# Patient Record
Sex: FEMALE | Race: WHITE | NOT HISPANIC OR LATINO | Employment: FULL TIME | URBAN - METROPOLITAN AREA
[De-identification: names, ages, dates, MRNs, and addresses within clinical notes are randomized per-mention and may not be internally consistent; named-entity substitution may affect disease eponyms.]

---

## 2017-01-12 ENCOUNTER — HOSPITAL ENCOUNTER (OUTPATIENT)
Dept: RADIOLOGY | Facility: HOSPITAL | Age: 37
Discharge: HOME | End: 2017-01-12
Attending: OBSTETRICS & GYNECOLOGY
Payer: COMMERCIAL

## 2017-01-12 ENCOUNTER — OFFICE VISIT (OUTPATIENT)
Dept: OBSTETRICS AND GYNECOLOGY | Facility: CLINIC | Age: 37
End: 2017-01-12
Payer: COMMERCIAL

## 2017-01-12 VITALS
BODY MASS INDEX: 26.08 KG/M2 | DIASTOLIC BLOOD PRESSURE: 64 MMHG | WEIGHT: 162.25 LBS | HEIGHT: 66 IN | SYSTOLIC BLOOD PRESSURE: 110 MMHG

## 2017-01-12 DIAGNOSIS — Z11.51 SCREENING FOR HPV (HUMAN PAPILLOMAVIRUS): ICD-10-CM

## 2017-01-12 DIAGNOSIS — Z12.31 ENCOUNTER FOR SCREENING MAMMOGRAM FOR MALIGNANT NEOPLASM OF BREAST: ICD-10-CM

## 2017-01-12 DIAGNOSIS — Z01.419 ENCOUNTER FOR ANNUAL ROUTINE GYNECOLOGICAL EXAMINATION: Primary | ICD-10-CM

## 2017-01-12 DIAGNOSIS — Z12.4 ENCOUNTER FOR PAPANICOLAOU SMEAR FOR CERVICAL CANCER SCREENING: ICD-10-CM

## 2017-01-12 PROCEDURE — 77067 SCR MAMMO BI INCL CAD: CPT | Mod: 26,,, | Performed by: RADIOLOGY

## 2017-01-12 PROCEDURE — 88175 CYTOPATH C/V AUTO FLUID REDO: CPT

## 2017-01-12 PROCEDURE — 77067 SCR MAMMO BI INCL CAD: CPT | Mod: TC

## 2017-01-12 PROCEDURE — 77063 BREAST TOMOSYNTHESIS BI: CPT | Mod: 26,,, | Performed by: RADIOLOGY

## 2017-01-12 PROCEDURE — 99395 PREV VISIT EST AGE 18-39: CPT | Mod: S$GLB,,, | Performed by: OBSTETRICS & GYNECOLOGY

## 2017-01-12 PROCEDURE — 87624 HPV HI-RISK TYP POOLED RSLT: CPT

## 2017-01-12 PROCEDURE — 99999 PR PBB SHADOW E&M-EST. PATIENT-LVL III: CPT | Mod: PBBFAC,,, | Performed by: OBSTETRICS & GYNECOLOGY

## 2017-01-12 NOTE — MR AVS SNAPSHOT
[facility]  [address]  [place] [address]  Phone: [phone]  Fax: [phone]                  Sue Mccullough   2017 9:00 AM   Office Visit    Description:  Female : 1980   Provider:  Caryn Jansen MD   Department:  [facility]           Reason for Visit     Well Woman           Diagnoses this Visit        Comments    Encounter for annual routine gynecological examination    -  Primary     Encounter for Papanicolaou smear for cervical cancer screening         Screening for HPV (human papillomavirus)         Encounter for screening mammogram for malignant neoplasm of breast                To Do List           Future Appointments        Provider Department Dept Phone    2017 10:50 AM [facility] MAMMO2 SCREEN [facility] [phone]      Goals (5 Years of Data)     None      Follow-Up and Disposition     Return in about 1 year (around 2018).    Follow-up and Disposition History      [facility]     [facility] -  Assistance  Registered nurses in the [facility] provide clinical advisement, health education, appointment booking, and other advisory services.  Call for this free service at [phone].             Medications           Message regarding Medications     Verify the changes and/or additions to your medication regime listed below are the same as discussed with your clinician today.  If any of these changes or additions are incorrect, please notify your healthcare provider.             Verify that the below list of medications is an accurate representation of the medications you are currently taking.  If none reported, the list may be blank. If incorrect, please contact your healthcare provider. Carry this list with you in case of emergency.           Current Medications     CRANBERRY CON&FR/B.COAG/C/CALC (CRANBERRY-PROBIOTIC ORAL) once a day    montelukast (SINGULAIR) 10  "mg tablet            Clinical Reference Information           Vital Signs - Last Recorded  Most recent update: 1/12/2017  8:53 AM by Lillie Portillo LPN    BP Ht Wt LMP BMI    110/64 5' 6" (1.676 m) 73.6 kg (162 lb 4.1 oz) 12/24/2016 26.19 kg/m2      Blood Pressure          Most Recent Value    BP  110/64      Allergies as of 1/12/2017     No Known Allergies      Immunizations Administered on Date of Encounter - 1/12/2017     None      Orders Placed During Today's Visit      Normal Orders This Visit    HPV DNA probe, amplified     Liquid-based pap smear, screening     Future Labs/Procedures Expected by Expires    Mammo Digital Screening Bilat with Tomosynthesis CAD  1/12/2017 3/15/2018      [facility] Sign-Up     Activating your [facility] account is as easy as 1-2-3!     1) Visit [email or web address], select Sign Up Now, enter this activation code and your date of birth, then select Next.  [email or web address]  Expires: 1/23/2017  4:10 PM      2) Create a username and password to use when you visit [facility] in the future and select a security question in case you lose your password and select Next.    3) Enter your e-mail address and click Sign Up!    Additional Information  If you have questions, please e-mail [email or web address] or call [phone] to talk to our [facility] staff. Remember, [facility] is NOT to be used for urgent needs. For medical emergencies, dial 911.         "

## 2017-01-12 NOTE — PROGRESS NOTES
Subjective:       Patient ID: Sue Mccullough is a 36 y.o. female.    Chief Complaint:  Well Woman (pap 2 of 3 ascus + hpv)      History of Present Illness  - here for annual. No complaints.    Past Medical History   Diagnosis Date    Abnormal Pap smear of cervix     Acid reflux     Headaches, cluster      history    HPV (human papilloma virus) infection        Past Surgical History   Procedure Laterality Date    Dilation and curettage of uterus      Cervical conization   w/ laser           Current Outpatient Prescriptions:     CRANBERRY CON&FR/B.COAG/C/CALC (CRANBERRY-PROBIOTIC ORAL), once a day, Disp: , Rfl:     montelukast (SINGULAIR) 10 mg tablet, , Disp: , Rfl:     Review of patient's allergies indicates:  No Known Allergies    GYN & OB History  Patient's last menstrual period was 2016.   Date of Last Pap: 2016    OB History    Para Term  AB SAB TAB Ectopic Multiple Living   5 3 3  2 2    3      # Outcome Date GA Lbr Prasanth/2nd Weight Sex Delivery Anes PTL Lv   5 Term 14    M Vag-Spont None  Y   4 Term 11    M Vag-Spont EPI  Y   3 Term 08    M Vag-Spont EPI  Y   2 SAB            1 SAB                   Social History     Social History    Marital status:      Spouse name: N/A    Number of children: N/A    Years of education: N/A     Occupational History    Not on file.     Social History Main Topics    Smoking status: Former Smoker    Smokeless tobacco: Not on file    Alcohol use Yes    Drug use: No    Sexual activity: Yes     Partners: Male     Birth control/ protection: Partner-Vasectomy     Other Topics Concern    Not on file     Social History Narrative       Review of Systems  Review of Systems   Respiratory: Negative for shortness of breath.    Cardiovascular: Negative for chest pain and palpitations.   Gastrointestinal: Negative for blood in stool, nausea and vomiting.   Genitourinary:        - see HPI   Skin: Negative for rash and wound.  "  Allergic/Immunologic: Negative for immunocompromised state.   Neurological: Negative for dizziness and syncope.   Hematological: Negative for adenopathy.   Psychiatric/Behavioral: Negative for behavioral problems.        Objective:     Vitals:    01/12/17 0853   BP: 110/64   Weight: 73.6 kg (162 lb 4.1 oz)   Height: 5' 6" (1.676 m)       Physical Exam:   Constitutional: She is oriented to person, place, and time. She appears well-developed and well-nourished.        Pulmonary/Chest: Right breast exhibits no mass, no nipple discharge, no skin change, no tenderness and no swelling. Left breast exhibits no mass, no nipple discharge, no skin change, no tenderness and no swelling. Breasts are symmetrical.        Abdominal: Soft. She exhibits no distension. There is no tenderness.     Genitourinary: Vagina normal and uterus normal. There is no tenderness or lesion on the right labia. There is no tenderness or lesion on the left labia. Cervix is normal. Right adnexum displays no mass, no tenderness and no fullness. Left adnexum displays no mass, no tenderness and no fullness. No vaginal discharge found. Additional cervical findings: pap smear done          Musculoskeletal: Moves all extremeties.       Neurological: She is alert and oriented to person, place, and time.     Psychiatric: She has a normal mood and affect.        Assessment/ Plan:     Orders Placed This Encounter    HPV DNA probe, amplified    Mammo Digital Screening Bilat with Tomosynthesis CAD    Liquid-based pap smear, screening       Sue was seen today for well woman.    Diagnoses and all orders for this visit:    Encounter for annual routine gynecological examination    Encounter for Papanicolaou smear for cervical cancer screening  -     Liquid-based pap smear, screening    Screening for HPV (human papillomavirus)  -     HPV DNA probe, amplified    Encounter for screening mammogram for malignant neoplasm of breast  -     Mammo Digital Screening " Bilat with Tomosynthesis CAD; Future        Return in about 1 year (around 1/12/2018) if Pap is normal and HPV test is negative.

## 2017-01-13 ENCOUNTER — TELEPHONE (OUTPATIENT)
Dept: OBSTETRICS AND GYNECOLOGY | Facility: CLINIC | Age: 37
End: 2017-01-13

## 2017-01-13 NOTE — TELEPHONE ENCOUNTER
Left detailed message informing pt that her mammogram was normal and she is to repeat it at age 41. Instructed to call back if she has any questions.    ----- Message from Caryn Jansen MD sent at 1/13/2017 10:48 AM CST -----  Call patient. Normal mammogram. Repeat at age 41.

## 2017-01-20 LAB — HUMAN PAPILLOMAVIRUS (HPV): NOT DETECTED

## 2018-02-01 ENCOUNTER — OFFICE VISIT (OUTPATIENT)
Dept: OBSTETRICS AND GYNECOLOGY | Facility: CLINIC | Age: 38
End: 2018-02-01
Payer: COMMERCIAL

## 2018-02-01 VITALS — BODY MASS INDEX: 26.68 KG/M2 | HEIGHT: 66 IN | WEIGHT: 166 LBS

## 2018-02-01 DIAGNOSIS — Z01.419 ENCOUNTER FOR GYNECOLOGICAL EXAMINATION: Primary | ICD-10-CM

## 2018-02-01 PROCEDURE — 99999 PR PBB SHADOW E&M-EST. PATIENT-LVL II: CPT | Mod: PBBFAC,,, | Performed by: OBSTETRICS & GYNECOLOGY

## 2018-02-01 PROCEDURE — 99395 PREV VISIT EST AGE 18-39: CPT | Mod: S$GLB,,, | Performed by: OBSTETRICS & GYNECOLOGY

## 2018-02-01 NOTE — PROGRESS NOTES
Subjective:       Patient ID: Sue Mccullough is a 38 y.o. female.    Chief Complaint:  Well Woman (last pap  neg, hpv neg ,pt had a yeast inf  and took diflucan and a 3 day treatment for yeast .)      History of Present Illness  - here for annual. Starting to feel that yeast symptoms are resolving; today used day 2 of a 3 day treatment for yeast.    Past Medical History:   Diagnosis Date    Abnormal Pap smear of cervix     Acid reflux     Headaches, cluster     history    HPV (human papilloma virus) infection        Past Surgical History:   Procedure Laterality Date    CERVICAL CONIZATION   W/ LASER      DILATION AND CURETTAGE OF UTERUS           Current Outpatient Prescriptions:     CRANBERRY CON&FR/B.COAG/C/CALC (CRANBERRY-PROBIOTIC ORAL), once a day, Disp: , Rfl:     montelukast (SINGULAIR) 10 mg tablet, , Disp: , Rfl:     Review of patient's allergies indicates:  No Known Allergies    GYN & OB History  Patient's last menstrual period was 01/15/2018.   Date of Last Pap: 2017    OB History    Para Term  AB Living   5 3 3   2 3   SAB TAB Ectopic Multiple Live Births   2       3      # Outcome Date GA Lbr Prasanth/2nd Weight Sex Delivery Anes PTL Lv   5 Term //    M Vag-Spont None  MACHELLE   4 Term /    M Vag-Spont EPI  [facility]HELLE   3 Term 08    M Vag-Spont EPI  [facility]HELLE   2 SAB            1 SAB                   Social History     Social History    Marital status:      Spouse name: N/A    Number of children: N/A    Years of education: N/A     Occupational History    Not on file.     Social History Main Topics    Smoking status: Former Smoker    Smokeless tobacco: Never Used    Alcohol use Yes    Drug use: No    Sexual activity: Yes     Partners: Male     Birth control/ protection: Partner-Vasectomy     Other Topics Concern    Not on file     Social History Narrative    No narrative on file       Family History   Problem Relation Age of Onset    Stroke Maternal  "Grandmother     Diabetes Maternal Grandfather     Miscarriages / Stillbirths Mother     Breast cancer Neg Hx     Colon cancer Neg Hx     Eclampsia Neg Hx     Hypertension Neg Hx     Ovarian cancer Neg Hx      labor Neg Hx        Review of Systems  Review of Systems   Respiratory: Negative for shortness of breath.    Cardiovascular: Negative for chest pain and palpitations.   Gastrointestinal: Negative for blood in stool, nausea and vomiting.   Genitourinary:        - see HPI   Skin: Negative for rash and wound.   Allergic/Immunologic: Negative for immunocompromised state.   Neurological: Negative for dizziness and syncope.   Hematological: Negative for adenopathy.   Psychiatric/Behavioral: Negative for behavioral problems.        Objective:     Vitals:    18 1412   Weight: 75.3 kg (166 lb 0.1 oz)   Height: 5' 6" (1.676 m)       Physical Exam:   Constitutional: She is oriented to person, place, and time. She appears well-developed and well-nourished.        Pulmonary/Chest: Right breast exhibits no mass, no nipple discharge, no skin change, no tenderness and no swelling. Left breast exhibits no mass, no nipple discharge, no skin change, no tenderness and no swelling. Breasts are symmetrical.        Abdominal: Soft. She exhibits no distension. There is no tenderness.     Genitourinary: Vagina normal and uterus normal. There is no tenderness or lesion on the right labia. There is no tenderness or lesion on the left labia. Cervix is normal. Right adnexum displays no mass, no tenderness and no fullness. Left adnexum displays no mass, no tenderness and no fullness. No vaginal discharge found.   Genitourinary Comments: Cream c/w yeast treatment in vault           Musculoskeletal: Moves all extremeties.       Neurological: She is alert and oriented to person, place, and time.     Psychiatric: She has a normal mood and affect.        Assessment/ Plan:          Sue was seen today for well " woman.    Diagnoses and all orders for this visit:    Encounter for gynecological examination        Follow-up in about 1 year (around 2/1/2019) for annual exam.

## 2019-02-22 ENCOUNTER — OFFICE VISIT (OUTPATIENT)
Dept: OBSTETRICS AND GYNECOLOGY | Facility: CLINIC | Age: 39
End: 2019-02-22
Payer: COMMERCIAL

## 2019-02-22 VITALS
BODY MASS INDEX: 27.19 KG/M2 | DIASTOLIC BLOOD PRESSURE: 60 MMHG | WEIGHT: 169.19 LBS | SYSTOLIC BLOOD PRESSURE: 106 MMHG | HEIGHT: 66 IN

## 2019-02-22 DIAGNOSIS — Z01.419 ENCOUNTER FOR GYNECOLOGICAL EXAMINATION: Primary | ICD-10-CM

## 2019-02-22 DIAGNOSIS — Z12.4 ENCOUNTER FOR PAPANICOLAOU SMEAR FOR CERVICAL CANCER SCREENING: ICD-10-CM

## 2019-02-22 PROCEDURE — 99395 PREV VISIT EST AGE 18-39: CPT | Mod: S$GLB,,, | Performed by: OBSTETRICS & GYNECOLOGY

## 2019-02-22 PROCEDURE — 99999 PR PBB SHADOW E&M-EST. PATIENT-LVL II: ICD-10-PCS | Mod: PBBFAC,,, | Performed by: OBSTETRICS & GYNECOLOGY

## 2019-02-22 PROCEDURE — 99395 PR PREVENTIVE VISIT,EST,18-39: ICD-10-PCS | Mod: S$GLB,,, | Performed by: OBSTETRICS & GYNECOLOGY

## 2019-02-22 PROCEDURE — 99999 PR PBB SHADOW E&M-EST. PATIENT-LVL II: CPT | Mod: PBBFAC,,, | Performed by: OBSTETRICS & GYNECOLOGY

## 2019-02-22 NOTE — PROGRESS NOTES
Subjective:       Patient ID: Sue Mccullough is a 39 y.o. female.    Chief Complaint:  Well Woman (last pap and hpv: normal 2017 last mammo: normal 2017)      History of Present Illness  - here for annual. No complaints. Regular periods.    Past Medical History:   Diagnosis Date    Abnormal Pap smear of cervix     Acid reflux     Headaches, cluster     history    HPV (human papilloma virus) infection        Past Surgical History:   Procedure Laterality Date    CERVICAL CONIZATION   W/ LASER      DILATION AND CURETTAGE OF UTERUS         No current outpatient medications on file.    Review of patient's allergies indicates:  No Known Allergies    GYN & OB History  Patient's last menstrual period was 2019.   Date of Last Pap: 2017    OB History    Para Term  AB Living   5 3 3   2 3   SAB TAB Ectopic Multiple Live Births   2       3      # Outcome Date GA Lbr Prasanth/2nd Weight Sex Delivery Anes PTL Lv   5 Term 14    M Vag-Spont None  [facility]LE   4 Term 11    M Vag-Spont EPI  [facility]LE   3 Term 08    M Vag-Spont EPI  [facility]HELLE   2 SAB            1 SAB                   Social History     Socioeconomic History    Marital status:      Spouse name: Not on file    Number of children: Not on file    Years of education: Not on file    Highest education level: Not on file   Social Needs    Financial resource strain: Not on file    Food insecurity - worry: Not on file    Food insecurity - inability: Not on file    Transportation needs - medical: Not on file    Transportation needs - non-medical: Not on file   Occupational History    Not on file   Tobacco Use    Smoking status: Former Smoker    Smokeless tobacco: Never Used   Substance and Sexual Activity    Alcohol use: Yes    Drug use: No    Sexual activity: Yes     Partners: Male     Birth control/protection: Partner-Vasectomy   Other Topics Concern    Not on file   Social History Narrative    Not on file  "      Family History   Problem Relation Age of Onset    Stroke Maternal Grandmother     Diabetes Maternal Grandfather     Miscarriages / Stillbirths Mother     Breast cancer Neg Hx     Colon cancer Neg Hx     Eclampsia Neg Hx     Hypertension Neg Hx     Ovarian cancer Neg Hx      labor Neg Hx        Review of Systems  Review of Systems   Respiratory: Negative for shortness of breath.    Cardiovascular: Negative for chest pain and palpitations.   Gastrointestinal: Negative for blood in stool, nausea and vomiting.   Genitourinary:        - see HPI   Skin: Negative for rash and wound.   Allergic/Immunologic: Negative for immunocompromised state.   Neurological: Negative for dizziness and syncope.   Hematological: Negative for adenopathy.   Psychiatric/Behavioral: Negative for behavioral problems.        Objective:     Vitals:    19 1034   BP: 106/60   Weight: 76.8 kg (169 lb 3.3 oz)   Height: 5' 6" (1.676 m)       Physical Exam:   Constitutional: She is oriented to person, place, and time. She appears well-developed and well-nourished.        Pulmonary/Chest: Right breast exhibits no mass, no nipple discharge, no skin change, no tenderness and no swelling. Left breast exhibits no mass, no nipple discharge, no skin change, no tenderness and no swelling. Breasts are symmetrical.        Abdominal: Soft. She exhibits no distension. There is no tenderness.     Genitourinary: Vagina normal and uterus normal. There is no tenderness or lesion on the right labia. There is no tenderness or lesion on the left labia. Cervix is normal. Right adnexum displays no mass, no tenderness and no fullness. Left adnexum displays no mass, no tenderness and no fullness. No vaginal discharge found.           Musculoskeletal: Moves all extremeties.       Neurological: She is alert and oriented to person, place, and time.     Psychiatric: She has a normal mood and affect.        Assessment/ Plan:          Sue was seen " today for well woman.    Diagnoses and all orders for this visit:    Encounter for gynecological examination    Encounter for Papanicolaou smear for cervical cancer screening  -     Cancel: Liquid-based pap smear, screening        Follow-up in about 1 year (around 2/22/2020) for annual exam.

## 2020-02-27 ENCOUNTER — OFFICE VISIT (OUTPATIENT)
Dept: OBSTETRICS AND GYNECOLOGY | Facility: CLINIC | Age: 40
End: 2020-02-27
Payer: COMMERCIAL

## 2020-02-27 VITALS
BODY MASS INDEX: 26.58 KG/M2 | HEIGHT: 66 IN | DIASTOLIC BLOOD PRESSURE: 72 MMHG | WEIGHT: 165.38 LBS | SYSTOLIC BLOOD PRESSURE: 118 MMHG

## 2020-02-27 DIAGNOSIS — Z11.51 ENCOUNTER FOR SCREENING FOR HUMAN PAPILLOMAVIRUS (HPV): ICD-10-CM

## 2020-02-27 DIAGNOSIS — Z12.4 ENCOUNTER FOR SCREENING FOR CERVICAL CANCER: ICD-10-CM

## 2020-02-27 DIAGNOSIS — Z01.419 ENCOUNTER FOR GYNECOLOGICAL EXAMINATION: Primary | ICD-10-CM

## 2020-02-27 PROCEDURE — 99999 PR PBB SHADOW E&M-EST. PATIENT-LVL II: ICD-10-PCS | Mod: PBBFAC,,, | Performed by: OBSTETRICS & GYNECOLOGY

## 2020-02-27 PROCEDURE — 87624 HPV HI-RISK TYP POOLED RSLT: CPT

## 2020-02-27 PROCEDURE — 99999 PR PBB SHADOW E&M-EST. PATIENT-LVL II: CPT | Mod: PBBFAC,,, | Performed by: OBSTETRICS & GYNECOLOGY

## 2020-02-27 PROCEDURE — 99395 PR PREVENTIVE VISIT,EST,18-39: ICD-10-PCS | Mod: S$GLB,,, | Performed by: OBSTETRICS & GYNECOLOGY

## 2020-02-27 PROCEDURE — 99395 PREV VISIT EST AGE 18-39: CPT | Mod: S$GLB,,, | Performed by: OBSTETRICS & GYNECOLOGY

## 2020-02-27 PROCEDURE — 88175 CYTOPATH C/V AUTO FLUID REDO: CPT

## 2020-02-27 RX ORDER — ESCITALOPRAM OXALATE 20 MG/1
10 TABLET ORAL
COMMUNITY
End: 2024-03-06 | Stop reason: DRUGHIGH

## 2020-02-27 RX ORDER — ALPRAZOLAM 0.5 MG/1
TABLET ORAL
COMMUNITY

## 2020-02-27 NOTE — PROGRESS NOTES
Subjective:       Patient ID: Sue Mccullough is a 40 y.o. female.    Chief Complaint:  Well Woman (last pap and HPV negative 17, last mammogram wnl 17)      History of Present Illness  - here for annual. No complaints. Regular periods.    Past Medical History:   Diagnosis Date    Abnormal Pap smear of cervix     Acid reflux     Anxiety     Headaches, cluster     history    HPV (human papilloma virus) infection        Past Surgical History:   Procedure Laterality Date    CERVICAL CONIZATION   W/ LASER      DILATION AND CURETTAGE OF UTERUS           Current Outpatient Medications:     ALPRAZolam (XANAX) 0.5 MG tablet, alprazolam 0.5 mg tablet  Take 1 tablet 3 times a day by oral route., Disp: , Rfl:     escitalopram oxalate (LEXAPRO) 20 MG tablet, 10 mg., Disp: , Rfl:     Review of patient's allergies indicates:  No Known Allergies    GYN & OB History  Patient's last menstrual period was 2020 (exact date).   Date of Last Pap: 2017    OB History    Para Term  AB Living   5 3 3   2 3   SAB TAB Ectopic Multiple Live Births   2       3      # Outcome Date GA Lbr Prasanth/2nd Weight Sex Delivery Anes PTL Lv   5 Term 14    M Vag-Spont None  [facility]LE   4 Term 11    M Vag-Spont EPI  [facility]LE   3 Term 08    M Vag-Spont EPI  [facility]LE   2 SAB            1 SAB                Social History     Socioeconomic History    Marital status:      Spouse name: Not on file    Number of children: Not on file    Years of education: Not on file    Highest education level: Not on file   Occupational History    Not on file   Social Needs    Financial resource strain: Not on file    Food insecurity:     Worry: Not on file     Inability: Not on file    Transportation needs:     Medical: Not on file     Non-medical: Not on file   Tobacco Use    Smoking status: Former Smoker    Smokeless tobacco: Never Used   Substance and Sexual Activity    Alcohol use: Yes     Comment: occasional     Drug  "use: No    Sexual activity: Yes     Partners: Male     Birth control/protection: Partner-Vasectomy   Lifestyle    Physical activity:     Days per week: Not on file     Minutes per session: Not on file    Stress: Not on file   Relationships    Social connections:     Talks on phone: Not on file     Gets together: Not on file     Attends [employer]ars[employer] service: Not on file     Active member of club or organization: Not on file     Attends meetings of clubs or organizations: Not on file     Relationship status: Not on file   Other Topics Concern    Not on file   Social History Narrative    Not on file       Family History   Problem Relation Age of Onset    Stroke Maternal Grandmother     Diabetes Maternal Grandfather     Miscarriages / Stillbirths Mother     Breast cancer Neg Hx     Colon cancer Neg Hx     Eclampsia Neg Hx     Hypertension Neg Hx     Ovarian cancer Neg Hx      labor Neg Hx        Review of Systems  Review of Systems   Respiratory: Negative for shortness of breath.    Cardiovascular: Negative for chest pain and palpitations.   Gastrointestinal: Negative for blood in stool, nausea and vomiting.   Genitourinary:        - see HPI   Skin: Negative for rash and wound.   Allergic/Immunologic: Negative for immunocompromised state.   Neurological: Negative for dizziness and syncope.   Hematological: Negative for adenopathy.   Psychiatric/Behavioral: Negative for behavioral problems.        Objective:     Vitals:    20 1015   BP: 118/72   Weight: 75 kg (165 lb 5.5 oz)   Height: 5' 6" (1.676 m)       Physical Exam:   Constitutional: She is oriented to person, place, and time. She appears well-developed and well-nourished.        Pulmonary/Chest: Right breast exhibits no mass, no nipple discharge, no skin change, no tenderness and no swelling. Left breast exhibits no mass, no nipple discharge, no skin change, no tenderness and no swelling. Breasts are symmetrical.        Abdominal: Soft. She " exhibits no distension. There is no tenderness.     Genitourinary: Vagina normal and uterus normal. There is no tenderness or lesion on the right labia. There is no tenderness or lesion on the left labia. Cervix is normal. Right adnexum displays no mass, no tenderness and no fullness. Left adnexum displays no mass, no tenderness and no fullness. No vaginal discharge found. Additional cervical findings: pap smear done          Musculoskeletal: Moves all extremeties.       Neurological: She is alert and oriented to person, place, and time.     Psychiatric: She has a normal mood and affect.        Assessment/ Plan:     Orders Placed This Encounter    HPV High Risk Genotypes, PCR    Liquid-Based Pap Smear, Screening       Sue was seen today for well woman.    Diagnoses and all orders for this visit:    Encounter for gynecological examination    Encounter for screening for cervical cancer   -     Liquid-Based Pap Smear, Screening    Encounter for screening for human papillomavirus (HPV)  -     HPV High Risk Genotypes, PCR        Follow up in about 1 year (around 2/27/2021) for annual exam.

## 2020-03-03 LAB
HPV HR 12 DNA SPEC QL NAA+PROBE: NEGATIVE
HPV16 AG SPEC QL: NEGATIVE
HPV18 DNA SPEC QL NAA+PROBE: NEGATIVE

## 2020-03-26 LAB
FINAL PATHOLOGIC DIAGNOSIS: NORMAL
Lab: NORMAL

## 2021-05-06 ENCOUNTER — PATIENT MESSAGE (OUTPATIENT)
Dept: RESEARCH | Facility: HOSPITAL | Age: 41
End: 2021-05-06

## 2021-05-10 ENCOUNTER — PATIENT MESSAGE (OUTPATIENT)
Dept: RESEARCH | Facility: HOSPITAL | Age: 41
End: 2021-05-10

## 2021-07-27 ENCOUNTER — TELEPHONE (OUTPATIENT)
Dept: OBSTETRICS AND GYNECOLOGY | Facility: CLINIC | Age: 41
End: 2021-07-27

## 2021-07-27 DIAGNOSIS — Z12.31 ENCOUNTER FOR SCREENING MAMMOGRAM FOR BREAST CANCER: Primary | ICD-10-CM

## 2021-10-28 ENCOUNTER — OFFICE VISIT (OUTPATIENT)
Dept: OBSTETRICS AND GYNECOLOGY | Facility: CLINIC | Age: 41
End: 2021-10-28
Attending: OBSTETRICS & GYNECOLOGY
Payer: COMMERCIAL

## 2021-10-28 VITALS
BODY MASS INDEX: 27.99 KG/M2 | DIASTOLIC BLOOD PRESSURE: 72 MMHG | SYSTOLIC BLOOD PRESSURE: 110 MMHG | HEIGHT: 66 IN | WEIGHT: 174.19 LBS

## 2021-10-28 DIAGNOSIS — Z01.419 ENCOUNTER FOR GYNECOLOGICAL EXAMINATION: Primary | ICD-10-CM

## 2021-10-28 PROCEDURE — 99999 PR PBB SHADOW E&M-EST. PATIENT-LVL III: CPT | Mod: PBBFAC,,, | Performed by: OBSTETRICS & GYNECOLOGY

## 2021-10-28 PROCEDURE — 99999 PR PBB SHADOW E&M-EST. PATIENT-LVL III: ICD-10-PCS | Mod: PBBFAC,,, | Performed by: OBSTETRICS & GYNECOLOGY

## 2021-10-28 PROCEDURE — 99396 PREV VISIT EST AGE 40-64: CPT | Mod: S$GLB,,, | Performed by: OBSTETRICS & GYNECOLOGY

## 2021-10-28 PROCEDURE — 99396 PR PREVENTIVE VISIT,EST,40-64: ICD-10-PCS | Mod: S$GLB,,, | Performed by: OBSTETRICS & GYNECOLOGY

## 2021-10-28 RX ORDER — AZELASTINE HYDROCHLORIDE, FLUTICASONE PROPIONATE 137; 50 UG/1; UG/1
SPRAY, METERED NASAL
COMMUNITY

## 2022-05-09 ENCOUNTER — TELEPHONE (OUTPATIENT)
Dept: OBSTETRICS AND GYNECOLOGY | Facility: CLINIC | Age: 42
End: 2022-05-09

## 2022-05-09 RX ORDER — SULFAMETHOXAZOLE AND TRIMETHOPRIM 800; 160 MG/1; MG/1
1 TABLET ORAL 2 TIMES DAILY
Qty: 14 TABLET | Refills: 0 | Status: SHIPPED | OUTPATIENT
Start: 2022-05-09 | End: 2023-02-02

## 2022-05-09 NOTE — TELEPHONE ENCOUNTER
Spoke with pt, yesterday start with a pinching feeling on left side. Swollen, zoe sit on that side, red. 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 Zoe come in today but wanted an antibiotic called in. Told her to also do sitz baths and warm compresses. Pharmacy and allergies verified.     Bactrim pended. Told pt if it doesn't clear up then we can get her in to have it looked at.

## 2023-02-02 ENCOUNTER — OFFICE VISIT (OUTPATIENT)
Dept: OBSTETRICS AND GYNECOLOGY | Facility: CLINIC | Age: 43
End: 2023-02-02
Attending: STUDENT IN AN ORGANIZED HEALTH CARE EDUCATION/TRAINING PROGRAM
Payer: COMMERCIAL

## 2023-02-02 VITALS
HEIGHT: 66 IN | WEIGHT: 146.63 LBS | BODY MASS INDEX: 23.57 KG/M2 | SYSTOLIC BLOOD PRESSURE: 106 MMHG | DIASTOLIC BLOOD PRESSURE: 72 MMHG | RESPIRATION RATE: 1 BRPM

## 2023-02-02 DIAGNOSIS — Z11.51 SCREENING FOR HPV (HUMAN PAPILLOMAVIRUS): ICD-10-CM

## 2023-02-02 DIAGNOSIS — Z01.419 ENCOUNTER FOR GYNECOLOGICAL EXAMINATION: Primary | ICD-10-CM

## 2023-02-02 DIAGNOSIS — Z12.31 ENCOUNTER FOR SCREENING MAMMOGRAM FOR BREAST CANCER: ICD-10-CM

## 2023-02-02 DIAGNOSIS — Z12.4 SCREENING FOR CERVICAL CANCER: ICD-10-CM

## 2023-02-02 PROCEDURE — 99999 PR PBB SHADOW E&M-EST. PATIENT-LVL III: ICD-10-PCS | Mod: PBBFAC,,, | Performed by: STUDENT IN AN ORGANIZED HEALTH CARE EDUCATION/TRAINING PROGRAM

## 2023-02-02 PROCEDURE — 88175 CYTOPATH C/V AUTO FLUID REDO: CPT | Performed by: STUDENT IN AN ORGANIZED HEALTH CARE EDUCATION/TRAINING PROGRAM

## 2023-02-02 PROCEDURE — 99396 PREV VISIT EST AGE 40-64: CPT | Mod: S$GLB,,, | Performed by: STUDENT IN AN ORGANIZED HEALTH CARE EDUCATION/TRAINING PROGRAM

## 2023-02-02 PROCEDURE — 87624 HPV HI-RISK TYP POOLED RSLT: CPT | Performed by: STUDENT IN AN ORGANIZED HEALTH CARE EDUCATION/TRAINING PROGRAM

## 2023-02-02 PROCEDURE — 99999 PR PBB SHADOW E&M-EST. PATIENT-LVL III: CPT | Mod: PBBFAC,,, | Performed by: STUDENT IN AN ORGANIZED HEALTH CARE EDUCATION/TRAINING PROGRAM

## 2023-02-02 PROCEDURE — 99396 PR PREVENTIVE VISIT,EST,40-64: ICD-10-PCS | Mod: S$GLB,,, | Performed by: STUDENT IN AN ORGANIZED HEALTH CARE EDUCATION/TRAINING PROGRAM

## 2023-02-02 RX ORDER — TIRZEPATIDE 7.5 MG/.5ML
INJECTION, SOLUTION SUBCUTANEOUS
COMMUNITY
Start: 2022-10-18 | End: 2024-03-06

## 2023-02-02 RX ORDER — IBUPROFEN 200 MG
TABLET ORAL EVERY 6 HOURS PRN
COMMUNITY

## 2023-02-02 NOTE — PROGRESS NOTES
"Chief Complaint: Well Woman Exam     HPI:      Sue Mccullough is a 43 y.o.  who presents today for well woman exam. Dr. Jansen's patient. Regular cycles. No GYN issues, problems, or complaints. Specifically, patient denies abnormal vaginal bleeding, discharge, pelvic pain, urinary problems, or changes in appetite. Ms. Mccullough is currently sexually active with a single male partner - . She is currently using partner's vasectomy for contraception.     Previous Pap: NILM, HPV negative (3/26/2020)  Previous Mammogram: reports normal in     Past Medical History:   Diagnosis Date    Abnormal Pap smear of cervix     Acid reflux     Anxiety     Headaches, cluster     history    HPV (human papilloma virus) infection      Past Surgical History:   Procedure Laterality Date    CERVICAL CONIZATION   W/ LASER      DILATION AND CURETTAGE OF UTERUS       Social History     Socioeconomic History    Marital status:    Tobacco Use    Smoking status: Former    Smokeless tobacco: Never   Substance and Sexual Activity    Alcohol use: Yes     Comment: occasional     Drug use: No    Sexual activity: Yes     Partners: Male     Birth control/protection: Partner-Vasectomy     Family History   Problem Relation Age of Onset    Stroke Maternal Grandmother     Diabetes Maternal Grandfather     Miscarriages / Stillbirths Mother     Breast cancer Neg Hx     Colon cancer Neg Hx     Eclampsia Neg Hx     Hypertension Neg Hx     Ovarian cancer Neg Hx      labor Neg Hx      Review of patient's allergies indicates:  No Known Allergies    OB History          5    Para   3    Term   3            AB   2    Living   3         SAB   2    IAB        Ectopic        Multiple        Live Births   3               Physical Exam:      PHYSICAL EXAM:  /72   Ht 5' 6" (1.676 m)   Wt 66.5 kg (146 lb 9.7 oz)   LMP 2023 (Exact Date)   BMI 23.66 kg/m²   Body mass index is 23.66 kg/m².     APPEARANCE: Well " nourished, well developed, in no acute distress.  PSYCH: Appropriate mood and affect.  SKIN: No acne or hirsutism  NECK: Neck symmetric without masses  NODES: No inguinal, axillary, lymph node enlargement  ABDOMEN: Soft.  No tenderness or masses.    CARDIOVASCULAR: No edema of peripheral extremities  BREASTS: Symmetrical, no visible skin lesions. Lumpy bumpy feel to bilateral breasts. No nipple discharge bilaterally.  PELVIC: Normal external genitalia without lesions.  Normal hair distribution.  Adequate perineal body, normal urethral meatus.  Vagina moist and well rugated. Without lesions. Without discharge.  Cervix pink, without lesions, discharge or tenderness.  No significant cystocele or rectocele.  Bimanual exam shows uterus to be normal size, regular, mobile and nontender.  Adnexa without masses or tenderness.      Assessment/Plan:     Encounter for gynecological examination    Encounter for screening mammogram for breast cancer  -     Mammo Digital Screening Bilat w/ Ramos; Future; Expected date: 02/02/2023    Screening for HPV (human papillomavirus)  -     HPV High Risk Genotypes, PCR    Screening for cervical cancer  -     Liquid-Based Pap Smear, Screening      - pelvic normal  - pap/hpv collected  - external MMG orders provided - she will have it done at [facility] where she works  - routine labs with PCP    Follow up in about 1 year (around 2/2/2024) for annual exam.    Counseling:     Patient was counseled today on current ASCCP pap guidelines, the recommendation for yearly physical exams, safe driving habits, breast self awareness and annual mammograms. She is to see her PCP for other health maintenance.     Use of the [email or web address] Patient Portal discussed and encouraged during today's visit.     Raya Merchant MD  Obstetrics and Gynecology  [facility]

## 2023-02-16 ENCOUNTER — TELEPHONE (OUTPATIENT)
Dept: OBSTETRICS AND GYNECOLOGY | Facility: CLINIC | Age: 43
End: 2023-02-16
Payer: COMMERCIAL

## 2023-02-16 NOTE — TELEPHONE ENCOUNTER
Called and spoke to patient.  Patient was informed about pap smear result. Patient advised to please contact office if she has any additional questions/concerns.

## 2024-03-06 ENCOUNTER — PATIENT MESSAGE (OUTPATIENT)
Dept: OBSTETRICS AND GYNECOLOGY | Facility: CLINIC | Age: 44
End: 2024-03-06

## 2024-03-06 ENCOUNTER — OFFICE VISIT (OUTPATIENT)
Dept: OBSTETRICS AND GYNECOLOGY | Facility: CLINIC | Age: 44
End: 2024-03-06
Payer: COMMERCIAL

## 2024-03-06 VITALS
HEART RATE: 89 BPM | DIASTOLIC BLOOD PRESSURE: 74 MMHG | HEIGHT: 66 IN | WEIGHT: 162.94 LBS | SYSTOLIC BLOOD PRESSURE: 110 MMHG | BODY MASS INDEX: 26.19 KG/M2

## 2024-03-06 DIAGNOSIS — Z01.419 ENCOUNTER FOR GYNECOLOGICAL EXAMINATION: Primary | ICD-10-CM

## 2024-03-06 DIAGNOSIS — Z12.31 BREAST CANCER SCREENING BY MAMMOGRAM: ICD-10-CM

## 2024-03-06 PROCEDURE — 99396 PREV VISIT EST AGE 40-64: CPT | Mod: S$GLB,,, | Performed by: STUDENT IN AN ORGANIZED HEALTH CARE EDUCATION/TRAINING PROGRAM

## 2024-03-06 PROCEDURE — 99999 PR PBB SHADOW E&M-EST. PATIENT-LVL III: CPT | Mod: PBBFAC,,, | Performed by: STUDENT IN AN ORGANIZED HEALTH CARE EDUCATION/TRAINING PROGRAM

## 2024-03-06 RX ORDER — ACETAMINOPHEN 500 MG
TABLET ORAL
COMMUNITY

## 2024-03-06 RX ORDER — SUMATRIPTAN SUCCINATE 100 MG/1
TABLET ORAL
COMMUNITY
Start: 2024-02-29 | End: 2024-03-06

## 2024-03-06 RX ORDER — ONABOTULINUMTOXINA 200 [USP'U]/1
INJECTION, POWDER, LYOPHILIZED, FOR SOLUTION INTRADERMAL; INTRAMUSCULAR
COMMUNITY
Start: 2024-01-03

## 2024-03-06 RX ORDER — ESCITALOPRAM OXALATE 10 MG/1
TABLET, FILM COATED ORAL
COMMUNITY
Start: 2024-01-30

## 2024-03-06 RX ORDER — CEFDINIR 300 MG/1
300 CAPSULE ORAL 2 TIMES DAILY
COMMUNITY
Start: 2023-12-27 | End: 2024-03-06

## 2024-03-06 RX ORDER — PHENTERMINE HYDROCHLORIDE 37.5 MG/1
TABLET ORAL
COMMUNITY
Start: 2023-11-03 | End: 2024-03-06

## 2024-03-06 NOTE — PROGRESS NOTES
Chief Complaint: Well Woman Exam     HPI:      Sue Mccullough is a 44 y.o.  who presents today for well woman exam.  Having regular cycles. Tension headaches over the past 3 months, occurring daily, some days are worse than others, has had nausea/vomiting with severe headache with some episodes, no headache currently, following with PCP - has tried triptans and botox without relief.  No GYN issues, problems, or complaints. Specifically, patient denies abnormal vaginal bleeding, discharge, pelvic pain, urinary problems, or changes in appetite. Ms. Mccullough is currently sexually active with a single male partner, . She is currently using partner's vasectomy for contraception.     Previous Pap: NILM, HPV negative (2023)  Previous Mammogram: BiRads: 1 ( at [facility])    Past Medical History:   Diagnosis Date    Abnormal Pap smear of cervix     Acid reflux     Anxiety     Headaches, cluster     history    HPV (human papilloma virus) infection      Past Surgical History:   Procedure Laterality Date    CERVICAL CONIZATION   W/ LASER      DILATION AND CURETTAGE OF UTERUS       Social History     Socioeconomic History    Marital status:    Tobacco Use    Smoking status: Former    Smokeless tobacco: Never   Substance and Sexual Activity    Alcohol use: Yes     Comment: occasional     Drug use: No    Sexual activity: Yes     Partners: Male     Birth control/protection: Partner-Vasectomy     Family History   Problem Relation Age of Onset    Stroke Maternal Grandmother     Diabetes Maternal Grandfather     Miscarriages / Stillbirths Mother     Breast cancer Neg Hx     Colon cancer Neg Hx     Eclampsia Neg Hx     Hypertension Neg Hx     Ovarian cancer Neg Hx      labor Neg Hx      Review of patient's allergies indicates:  No Known Allergies      OB History          5    Para   3    Term   3            AB   2    Living   3         SAB   2    IAB        Ectopic        Multiple        Live  "Births   3                 Physical Exam:      PHYSICAL EXAM:  /74   Pulse 89   Ht 5' 6" (1.676 m)   Wt 73.9 kg (162 lb 14.7 oz)   LMP  (LMP Unknown)   BMI 26.30 kg/m²   Body mass index is 26.3 kg/m².     APPEARANCE: Well nourished, well developed, in no acute distress.  PSYCH: Appropriate mood and affect.  SKIN: No acne or hirsutism  NECK: Neck symmetric without masses  NODES: No inguinal, axillary, or supraclavicular lymph node enlargement  ABDOMEN: Soft.  No tenderness or masses.    CARDIOVASCULAR: No edema of peripheral extremities  BREASTS: Symmetrical, no visible skin lesions. No palpable masses. No nipple discharge bilaterally.  PELVIC: Normal external genitalia without lesions.  Normal hair distribution.  Adequate perineal body, normal urethral meatus.  Vagina moist and well rugated. Without lesions. Without discharge.  Cervix pink, without lesions, discharge or tenderness.  No significant cystocele or rectocele.  Bimanual exam shows uterus to be normal size, regular, mobile and nontender.  Adnexa without masses or tenderness.      Assessment/Plan:     Encounter for gynecological examination    Breast cancer screening by mammogram  -     Mammo Digital Screening Bilat w/ Ramos; Future; Expected date: 03/06/2024    - pelvic exam normal  - pap/hpv due 2/2025  - MMG due and ordered  - no contraindication to nurtec, will try this and continue workup with PCP  - routine screening labs with PCP  - RTC 1 yr for annual or sooner if needed    Counseling:     Patient was counseled today on current ASCCP pap guidelines, the recommendation for yearly physical exams, safe driving habits, breast self awareness and annual mammograms. She is to see her PCP for other health maintenance.     Use of the [employer] Patient Portal discussed and encouraged during today's visit.     "

## 2024-03-18 ENCOUNTER — PATIENT MESSAGE (OUTPATIENT)
Dept: OBSTETRICS AND GYNECOLOGY | Facility: CLINIC | Age: 44
End: 2024-03-18
Payer: COMMERCIAL

## 2025-04-09 ENCOUNTER — OFFICE VISIT (OUTPATIENT)
Dept: OBSTETRICS AND GYNECOLOGY | Facility: CLINIC | Age: 45
End: 2025-04-09
Payer: COMMERCIAL

## 2025-04-09 VITALS
SYSTOLIC BLOOD PRESSURE: 114 MMHG | HEIGHT: 66 IN | WEIGHT: 174.81 LBS | DIASTOLIC BLOOD PRESSURE: 76 MMHG | BODY MASS INDEX: 28.09 KG/M2

## 2025-04-09 DIAGNOSIS — Z01.419 ENCOUNTER FOR GYNECOLOGICAL EXAMINATION: Primary | ICD-10-CM

## 2025-04-09 DIAGNOSIS — Z12.31 SCREENING MAMMOGRAM FOR BREAST CANCER: ICD-10-CM

## 2025-04-09 PROCEDURE — 99999 PR PBB SHADOW E&M-EST. PATIENT-LVL III: CPT | Mod: PBBFAC,,, | Performed by: STUDENT IN AN ORGANIZED HEALTH CARE EDUCATION/TRAINING PROGRAM

## 2025-04-09 PROCEDURE — 99396 PREV VISIT EST AGE 40-64: CPT | Mod: S$GLB,,, | Performed by: STUDENT IN AN ORGANIZED HEALTH CARE EDUCATION/TRAINING PROGRAM

## 2025-04-09 NOTE — PROGRESS NOTES
"Chief Complaint: Well Woman Exam     HPI:      Sue Mccullough is a 45 y.o.  who presents today for well woman exam. Dr Jansen patient. Regular cycles. LMP: Patient's last menstrual period was 2025 (exact date).    Today patient GYN complaints include: none.  Specifically, patient denies abnormal vaginal bleeding, discharge, pelvic pain, urinary problems.    Ms. Mccullough is currently sexually active with a single male partner. She is currently using partner's vasectomy for contraception.     Previous Pap: NILM, HPV negative (2023)  Previous Mammogram: BiRads: 1 (3/14/24 at outside hospital)    Past Medical History:   Diagnosis Date    Abnormal Pap smear of cervix     Acid reflux     Anxiety     Headaches, cluster     history    HPV (human papilloma virus) infection        Past Surgical History:   Procedure Laterality Date    CERVICAL CONIZATION   W/ LASER      DILATION AND CURETTAGE OF UTERUS         Social History[1]    Family History   Problem Relation Name Age of Onset    Stroke Maternal Grandmother      Diabetes Maternal Grandfather      Miscarriages / Stillbirths Mother      Breast cancer Neg Hx      Colon cancer Neg Hx      Eclampsia Neg Hx      Hypertension Neg Hx      Ovarian cancer Neg Hx       labor Neg Hx         Review of patient's allergies indicates:  No Known Allergies    OB History          5    Para   3    Term   3            AB   2    Living   3         SAB   2    IAB        Ectopic        Multiple        Live Births   3                 Physical Exam:      PHYSICAL EXAM:  /76   Ht 5' 6" (1.676 m)   Wt 79.3 kg (174 lb 13.2 oz)   LMP 2025 (Exact Date)   BMI 28.22 kg/m²   Body mass index is 28.22 kg/m².     APPEARANCE: Well nourished, well developed, in no acute distress.  PSYCH: Appropriate mood and affect.  SKIN: No acne or hirsutism  NECK: Neck symmetric without masses  NODES: No inguinal, axillary, or supraclavicular lymph node enlargement  ABDOMEN: " Soft.  No tenderness or masses.    CARDIOVASCULAR: No edema of peripheral extremities  BREASTS: Symmetrical, no visible skin lesions. No palpable masses. No nipple discharge bilaterally.  PELVIC: Normal external genitalia without lesions.  Normal hair distribution.  Adequate perineal body, normal urethral meatus.  Vagina moist and well rugated. Without lesions. With normal discharge.  Cervix pink, without lesions, discharge or tenderness.  No significant cystocele or rectocele.  Bimanual exam shows uterus to be normal size, regular, mobile and nontender.  Adnexa without masses or tenderness.      Assessment/Plan:     Encounter for gynecological examination    Screening mammogram for breast cancer  -     Mammo Digital Screening Bilat w/ Ramos ([facility]); Future; Expected date: 04/09/2025      - pelvic exam normal  - pap/hpv due 2/2026  - MMG due, paper order given   - other preventative care with PCP  - RTC 1 yr or sooner prn      Counseling:     Patient was counseled today on current ASCCP pap guidelines, the recommendation for yearly physical exams, safe driving habits, breast self awareness and annual mammograms. She is to see her PCP for other health maintenance.     Use of the [employer] Patient Portal discussed and encouraged during today's visit.          [1]   Social History  Socioeconomic History    Marital status:    Tobacco Use    Smoking status: Former    Smokeless tobacco: Never   Substance and Sexual Activity    Alcohol use: Yes     Comment: occasional     Drug use: No    Sexual activity: Yes     Partners: Male     Birth control/protection: Partner-Vasectomy

## 2025-05-05 ENCOUNTER — PATIENT MESSAGE (OUTPATIENT)
Dept: OBSTETRICS AND GYNECOLOGY | Facility: CLINIC | Age: 45
End: 2025-05-05
Payer: COMMERCIAL